# Patient Record
Sex: MALE | Race: WHITE | NOT HISPANIC OR LATINO | Employment: UNEMPLOYED | URBAN - METROPOLITAN AREA
[De-identification: names, ages, dates, MRNs, and addresses within clinical notes are randomized per-mention and may not be internally consistent; named-entity substitution may affect disease eponyms.]

---

## 2023-06-05 ENCOUNTER — HOSPITAL ENCOUNTER (EMERGENCY)
Facility: HOSPITAL | Age: 53
Discharge: HOME | End: 2023-06-05
Attending: INTERNAL MEDICINE

## 2023-06-05 VITALS
HEART RATE: 72 BPM | WEIGHT: 200 LBS | HEIGHT: 72 IN | TEMPERATURE: 99 F | SYSTOLIC BLOOD PRESSURE: 126 MMHG | RESPIRATION RATE: 18 BRPM | DIASTOLIC BLOOD PRESSURE: 86 MMHG | BODY MASS INDEX: 27.09 KG/M2 | OXYGEN SATURATION: 98 %

## 2023-06-05 DIAGNOSIS — R13.10 TROUBLE SWALLOWING: ICD-10-CM

## 2023-06-05 DIAGNOSIS — R13.10 DYSPHAGIA, UNSPECIFIED TYPE: Primary | ICD-10-CM

## 2023-06-05 PROCEDURE — 96372 THER/PROPH/DIAG INJ SC/IM: CPT | Performed by: PHYSICIAN ASSISTANT

## 2023-06-05 PROCEDURE — 63600175 PHARM REV CODE 636 W HCPCS: Mod: JZ,JG | Performed by: PHYSICIAN ASSISTANT

## 2023-06-05 PROCEDURE — 99284 EMERGENCY DEPT VISIT MOD MDM: CPT

## 2023-06-05 RX ORDER — GLUCAGON 1 MG
1 KIT INJECTION
Status: COMPLETED | OUTPATIENT
Start: 2023-06-05 | End: 2023-06-05

## 2023-06-05 RX ADMIN — GLUCAGON HYDROCHLORIDE 1 MG: 1 INJECTION, POWDER, FOR SOLUTION INTRAMUSCULAR; INTRAVENOUS; SUBCUTANEOUS at 02:06

## 2023-06-05 NOTE — Clinical Note
"Tres Caraballo (Thomas) was seen and treated in our emergency department on 6/5/2023.  He may return to work on 06/06/2023.       If you have any questions or concerns, please don't hesitate to call.       RN    "

## 2023-06-05 NOTE — ED NOTES
Pt ate a hamburger at about 11 pm and has felt like something was in  his throat since. Pt has thrown up everything he tries to swallow.    LOC: Pt is awake alert and aware of environment, oriented X3 and speaking appropriately  Appearance: Pt is in no acute distress, Pt is well groomed and clean  Skin: skin is warm and dry with normal turgor, mucus membranes are moist and pink, skin is intact with no bruising or breakdown  Muskuloskeletal: Pt moves all extremities well, there is no obvious swelling or deformities noted, pulses are intact.  Respiratory: Airway is open and patent, respirations are spontaneous and even.  Cardiac: no edema and cap refill is <3sec  Abdomen: soft, non-tender and non-distended  Neuro: Pt follows commands easily and has no obvious deficits

## 2023-06-05 NOTE — ED PROVIDER NOTES
Encounter Date: 6/5/2023       History     Chief Complaint   Patient presents with    foreign body in the throat     Pt was eating a hamburger at about 11 pm and feels like it is stuck in his throat, unable to keep anything down, Hx of prior food bolus     52-year-old male presents to the ER for evaluation of a suspected impacted food bolus.  Patient states that since eating a hamburger around 11:00 p.m. he feels as though it is stuck in his throat.  He has had this happened before.  He was able to successfully pass the food bolus without surgical intervention.  He is not tolerating his secretions.  Appears moderately uncomfortable on exam.  No respiratory distress.  No drooling.  Speech is clear.    Review of patient's allergies indicates:  No Known Allergies  History reviewed. No pertinent past medical history.  History reviewed. No pertinent surgical history.  No family history on file.     Review of Systems   Constitutional:  Negative for fever.   HENT:  Negative for sore throat.         Trouble swallowing   Respiratory:  Negative for shortness of breath.    Cardiovascular:  Negative for chest pain.   Gastrointestinal:  Negative for nausea.   Genitourinary:  Negative for dysuria.   Musculoskeletal:  Negative for back pain.   Skin:  Negative for rash.   Neurological:  Negative for weakness.   Hematological:  Does not bruise/bleed easily.     Physical Exam     Initial Vitals [06/05/23 0146]   BP Pulse Resp Temp SpO2   (!) 142/72 64 18 97.7 °F (36.5 °C) 97 %      MAP       --         Physical Exam    Constitutional: Vital signs are normal. He appears well-developed and well-nourished.   HENT:   Head: Normocephalic and atraumatic.   Right Ear: Hearing normal.   Left Ear: Hearing normal.   No acute findings on exam.  Normal voice.  No dysphagia.  No obstructions within the posterior oropharynx   Eyes: Conjunctivae are normal.   Cardiovascular:  Normal rate and regular rhythm.           Abdominal: Abdomen is soft.  Bowel sounds are normal.   Musculoskeletal:         General: Normal range of motion.     Neurological: He is alert and oriented to person, place, and time.   Skin: Skin is warm and intact.   Psychiatric: He has a normal mood and affect. His speech is normal and behavior is normal. Cognition and memory are normal.       ED Course   Procedures  Labs Reviewed - No data to display       Imaging Results              X-Ray Neck Soft Tissue (Final result)  Result time 06/05/23 02:52:13      Final result by Colt Mackay MD (06/05/23 02:52:13)                   Impression:      Radiographic findings as above.      Electronically signed by: Colt Mackay  Date:    06/05/2023  Time:    02:52               Narrative:    EXAMINATION:  XR NECK SOFT TISSUE    CLINICAL HISTORY:  Dysphagia, unspecified    TECHNIQUE:  AP and lateral soft tissue views the neck were performed.    COMPARISON:  None.    FINDINGS:  The airway appears patent, there is no evidence for abnormal thickening of the epiglottis.  There is no radiographically detectable radiopaque foreign body noted.  There does appear to be mild prominent appearance of the prevertebral space at C6 measuring up to approximately 18.7 mm and at C7 measuring approximately 20.3 mm.  This is nonspecific, can be seen with they prevertebral space-occupying process, note is made of the history of concern for food bolus, there is no radiographically detectable radiopaque abnormality however a food bolus may not be radiopaque.  Clinical and historical correlation is needed to determine need for further evaluation.    The visualized osseous structures appear intact, chronic changes are noted.                                       Medications   glucagon (human recombinant) injection 1 mg (1 mg Intramuscular Given 6/5/23 0216)     Medical Decision Making:   History:   Old Medical Records: I decided to obtain old medical records.  Old Records Summarized: records from clinic  visits.  Initial Assessment:   52-year-old male with a possible impacted food bolus  Differential Diagnosis:   Impacted food bolus, esophageal dysmotility, GERD,  Clinical Tests:   Radiological Study: Ordered and Reviewed  ED Management:  Plan:  Glucagon, attempt treatment with Coca-Cola and reassessment  Glucagon administered.  Patient given Coca-Cola to sip on while jumping up and down with attempt to pass the food bolus    On reassessment at 3:20 a.m. in the morning the patient was able to pass the food bolus.  He is tolerating p.o..  No respiratory distress.  Patient discharged home.                        Clinical Impression:   Final diagnoses:  [R13.10] Trouble swallowing  [R13.10] Trouble swallowing - eval for food bolus  [R13.10] Dysphagia, unspecified type (Primary)        ED Disposition Condition    Discharge Stable          ED Prescriptions    None       Follow-up Information    None          Erik Jensen PA-C  06/05/23 2331

## 2023-06-05 NOTE — DISCHARGE INSTRUCTIONS

## 2023-06-17 ENCOUNTER — HOSPITAL ENCOUNTER (EMERGENCY)
Facility: HOSPITAL | Age: 53
Discharge: HOME | End: 2023-06-17
Attending: EMERGENCY MEDICINE

## 2023-06-17 VITALS
TEMPERATURE: 98 F | SYSTOLIC BLOOD PRESSURE: 153 MMHG | HEART RATE: 67 BPM | WEIGHT: 200 LBS | BODY MASS INDEX: 27.09 KG/M2 | RESPIRATION RATE: 20 BRPM | DIASTOLIC BLOOD PRESSURE: 94 MMHG | HEIGHT: 72 IN | OXYGEN SATURATION: 100 %

## 2023-06-17 DIAGNOSIS — R09.89 ABNORMAL LUNG SOUNDS: ICD-10-CM

## 2023-06-17 DIAGNOSIS — J01.90 ACUTE BACTERIAL SINUSITIS: Primary | ICD-10-CM

## 2023-06-17 DIAGNOSIS — B96.89 ACUTE BACTERIAL SINUSITIS: Primary | ICD-10-CM

## 2023-06-17 LAB
CTP QC/QA: YES
CTP QC/QA: YES
MOLECULAR STREP A: NEGATIVE
SARS-COV-2 RDRP RESP QL NAA+PROBE: NEGATIVE

## 2023-06-17 PROCEDURE — 99284 EMERGENCY DEPT VISIT MOD MDM: CPT | Mod: 25

## 2023-06-17 PROCEDURE — 87635 SARS-COV-2 COVID-19 AMP PRB: CPT | Performed by: EMERGENCY MEDICINE

## 2023-06-17 PROCEDURE — 25000003 PHARM REV CODE 250

## 2023-06-17 PROCEDURE — 87651 STREP A DNA AMP PROBE: CPT

## 2023-06-17 RX ORDER — BENZONATATE 200 MG/1
200 CAPSULE ORAL 3 TIMES DAILY PRN
Qty: 30 CAPSULE | Refills: 0 | Status: SHIPPED | OUTPATIENT
Start: 2023-06-17 | End: 2023-06-27

## 2023-06-17 RX ORDER — AMOXICILLIN AND CLAVULANATE POTASSIUM 875; 125 MG/1; MG/1
1 TABLET, FILM COATED ORAL 2 TIMES DAILY
Qty: 14 TABLET | Refills: 0 | Status: SHIPPED | OUTPATIENT
Start: 2023-06-17

## 2023-06-17 RX ORDER — ONDANSETRON 4 MG/1
4 TABLET, ORALLY DISINTEGRATING ORAL
Status: COMPLETED | OUTPATIENT
Start: 2023-06-17 | End: 2023-06-17

## 2023-06-17 RX ORDER — FLUTICASONE PROPIONATE 50 MCG
2 SPRAY, SUSPENSION (ML) NASAL DAILY
Qty: 15.8 ML | Refills: 0 | Status: SHIPPED | OUTPATIENT
Start: 2023-06-17 | End: 2023-07-17

## 2023-06-17 RX ORDER — AMOXICILLIN AND CLAVULANATE POTASSIUM 875; 125 MG/1; MG/1
1 TABLET, FILM COATED ORAL
Status: COMPLETED | OUTPATIENT
Start: 2023-06-17 | End: 2023-06-17

## 2023-06-17 RX ORDER — IBUPROFEN 600 MG/1
600 TABLET ORAL EVERY 6 HOURS PRN
Qty: 20 TABLET | Refills: 0 | Status: SHIPPED | OUTPATIENT
Start: 2023-06-17

## 2023-06-17 RX ORDER — CETIRIZINE HYDROCHLORIDE 10 MG/1
10 TABLET ORAL DAILY
Qty: 30 TABLET | Refills: 0 | Status: SHIPPED | OUTPATIENT
Start: 2023-06-17 | End: 2024-06-16

## 2023-06-17 RX ORDER — GUAIFENESIN/DEXTROMETHORPHAN 100-10MG/5
5 SYRUP ORAL EVERY 6 HOURS PRN
Qty: 473 ML | Refills: 0 | Status: SHIPPED | OUTPATIENT
Start: 2023-06-17 | End: 2023-06-27

## 2023-06-17 RX ORDER — ACETAMINOPHEN 500 MG
1000 TABLET ORAL EVERY 6 HOURS PRN
Qty: 56 TABLET | Refills: 0 | Status: SHIPPED | OUTPATIENT
Start: 2023-06-17 | End: 2023-06-24

## 2023-06-17 RX ADMIN — AMOXICILLIN AND CLAVULANATE POTASSIUM 1 TABLET: 875; 125 TABLET, FILM COATED ORAL at 08:06

## 2023-06-17 RX ADMIN — ONDANSETRON 4 MG: 4 TABLET, ORALLY DISINTEGRATING ORAL at 08:06

## 2023-06-17 NOTE — ED PROVIDER NOTES
Encounter Date: 6/17/2023    SCRIBE #1 NOTE: IJulia, marjorie scribing for, and in the presence of,  Tres Zheng PA-C. I have scribed the following portions of the note - Other sections scribed: HPI, ROS.     History     Chief Complaint   Patient presents with    Sinusitis     Pt c/o sinuses. Pt c/o congestion, headache, sore throat. Pt nauseated.      52 year old male with no pertinent PMHx presents to the ED for a chief complaint of sinusitis onset 4 days ago.  Patient reports sinus pressure, nasal congestion, and cough.  Patient reports that he has had decreased appetite and decreased p.o. fluid intake so he has been getting occasionally lightheaded while working. He endorses taking Tylenol with temporary relief of his Sx. Patient further reports that he is unable to eat or drink. Further endorses cough, nasal congestion, sore throat, nausea, headache, dry mouth, wheezing when breathing, and nose pain around ethmoid sinuses area. No other alleviating or exacerbating factors. Denies Hx of HTN, KD, or DM. Patient is not tolerate to PO intake. NKDA.              The history is provided by the patient. No  was used.   Review of patient's allergies indicates:  No Known Allergies  No past medical history on file.  No past surgical history on file.  No family history on file.     Review of Systems   Constitutional:  Negative for diaphoresis, fatigue and unexpected weight change.   HENT:  Positive for congestion, sinus pain and sore throat.         (+) dry mouth   Eyes:  Negative for pain, redness and visual disturbance.   Respiratory:  Positive for cough and wheezing. Negative for chest tightness and shortness of breath.    Cardiovascular:  Negative for chest pain and palpitations.   Gastrointestinal:  Positive for nausea. Negative for abdominal pain, blood in stool, diarrhea and vomiting.   Endocrine: Negative for polydipsia, polyphagia and polyuria.   Genitourinary:  Negative for  dysuria, frequency and urgency.   Musculoskeletal:  Negative for arthralgias, back pain and myalgias.   Skin:  Negative for rash.   Allergic/Immunologic: Negative for environmental allergies.   Neurological:  Positive for headaches. Negative for dizziness, syncope and light-headedness.   Psychiatric/Behavioral:  Negative for suicidal ideas.      Physical Exam     Initial Vitals [06/17/23 0710]   BP Pulse Resp Temp SpO2   (!) 153/94 64 18 98.6 °F (37 °C) 96 %      MAP       --         Physical Exam    Nursing note and vitals reviewed.  Constitutional: Vital signs are normal. He appears well-developed and well-nourished. He is not diaphoretic. He is cooperative.  Non-toxic appearance. He does not appear ill. No distress.   Patient was clinically well-appearing and in no acute distress.  Nontoxic.   HENT:   Head: Normocephalic and atraumatic.   Right Ear: Hearing, tympanic membrane, external ear and ear canal normal. Tympanic membrane is not perforated, not retracted and not bulging.   Left Ear: Hearing, tympanic membrane, external ear and ear canal normal. Tympanic membrane is not perforated, not retracted and not bulging.   Nose: Right sinus exhibits maxillary sinus tenderness. Right sinus exhibits no frontal sinus tenderness. Left sinus exhibits maxillary sinus tenderness. Left sinus exhibits no frontal sinus tenderness.   Mouth/Throat: Oropharynx is clear and moist. Mucous membranes are dry. No oropharyngeal exudate, posterior oropharyngeal edema or posterior oropharyngeal erythema.   Nasal congestion present.  Tenderness to palpation over the maxillary sinuses, worst towards the medial aspect.   Eyes: Conjunctivae and EOM are normal. Pupils are equal, round, and reactive to light.   Neck: Phonation normal. Neck supple. No stridor present.   Normal range of motion.   Full passive range of motion without pain.     Cardiovascular:  Normal rate, regular rhythm, S1 normal, S2 normal and normal heart sounds.  No  extrasystoles are present.          No murmur heard.  Pulses:       Radial pulses are 2+ on the right side and 2+ on the left side.        Posterior tibial pulses are 2+ on the right side and 2+ on the left side.   Pulmonary/Chest: Effort normal. No accessory muscle usage. No tachypnea. No respiratory distress. He has rhonchi in the right middle field. He exhibits no tenderness.   Respirations even and unlabored.  Good air movement.  Expiratory rhonchi noted on the right middle lung fields.   Abdominal: Abdomen is soft and flat. Bowel sounds are normal. He exhibits no distension. There is no abdominal tenderness. There is no rebound and no guarding.   Musculoskeletal:         General: No tenderness. Normal range of motion.      Cervical back: Full passive range of motion without pain, normal range of motion and neck supple. No spinous process tenderness. Normal range of motion.     Lymphadenopathy:     He has no cervical adenopathy.   Neurological: He is alert and oriented to person, place, and time. GCS eye subscore is 4. GCS verbal subscore is 5. GCS motor subscore is 6.   Skin: Skin is warm and dry. Capillary refill takes less than 2 seconds. No rash noted. No pallor.   Psychiatric: His behavior is normal.       ED Course   Procedures  Labs Reviewed   POCT STREP A MOLECULAR   SARS-COV-2 RDRP GENE          Imaging Results              X-Ray Chest PA And Lateral (Final result)  Result time 06/17/23 09:01:57      Final result by Josr Bass MD (06/17/23 09:01:57)                   Impression:      No convincing evidence of acute cardiopulmonary disease.      Electronically signed by: Josr Bass  Date:    06/17/2023  Time:    09:01               Narrative:    EXAMINATION:  XR CHEST PA AND LATERAL    CLINICAL HISTORY:  Other specified symptoms and signs involving the circulatory and respiratory systems    TECHNIQUE:  PA and lateral views of the chest were  performed.    COMPARISON:  None    FINDINGS:  Cardiomediastinal contour appears to be within normal limits.  Lungs appear essentially clear.  No definite pneumothorax or large volume pleural effusion.    No acute findings in the visualized abdomen.  Osseous and soft tissue structures appear without definite acute change.                                    X-Rays:   Independently Interpreted Readings:   Chest X-Ray: No infiltrates. Trachea is midline.  Lungs symmetrically expanded.  No focal consolidative process.  Bilateral costophrenic angles are clear.  Cardiac silhouette is within normal limits.  Bones and soft tissues are unremarkable.    Medications   amoxicillin-clavulanate 875-125mg per tablet 1 tablet (1 tablet Oral Given 6/17/23 0856)   ondansetron disintegrating tablet 4 mg (4 mg Oral Given 6/17/23 0856)     Medical Decision Making:   History:   Old Medical Records: I decided to obtain old medical records.  Old Records Summarized: other records.       <> Summary of Records: External documents reviewed.  Initial Assessment:   52-year-old male presenting to the emergency department with a chief complaint of upper respiratory symptoms.  He has had sinus pressure, nasal congestion, and cough for the last week.  On physical exam, patient appears uncomfortable but is nontoxic appearing.  Nasal congestion and cough present.  Tenderness to palpation over the maxillary sinuses bilaterally.  Differential Diagnosis:   Differential diagnosis includes but is not limited to respiratory infections including COVID, flu, bronchitis, rhinosinusitis, or pneumonia, or noninfectious processes such as asthma, COPD or seasonal allergies.   Independently Interpreted Test(s):   I have ordered and independently interpreted X-rays - see prior notes.  Clinical Tests:   Lab Tests: Ordered and Reviewed       <> Summary of Lab: COVID and strep negative.  Radiological Study: Ordered and Reviewed  ED Management:  Patient presenting to the  emergency department with a chief complaint of upper respiratory symptoms.  History and physical exam findings as above.  Patient tested negative for COVID and strep.  X-ray negative for any acute cardiopulmonary process.  Presentation is consistent with acute bacterial rhinosinusitis.  First dose of Augmentin and Zofran were administered in the emergency department.  Augmentin and numerous symptomatic medications were electronically prescribed and sent to the patient's preferred pharmacy.  Discussed the importance of maintaining adequate hydration.    Return precautions were discussed, all patient questions were answered, and the patient was agreeable to the plan of care.  He was discharged home in stable condition and will follow up with his primary care provider or return to the emergency department if his symptoms worsen or do not improve.         Scribe Attestation:   Scribe #1: I performed the above scribed service and the documentation accurately describes the services I performed. I attest to the accuracy of the note.                 Scribe attestation: I, Tres Zheng PA-C, personally performed the services described in this documentation. All medical record entries made by the scribe were at my direction and in my presence.  I have reviewed the chart and agree that the record reflects my personal performance and is accurate and complete.    Clinical Impression:   Final diagnoses:  [R09.89] Abnormal lung sounds  [J01.90, B96.89] Acute bacterial sinusitis (Primary)        ED Disposition Condition    Discharge Stable          ED Prescriptions       Medication Sig Dispense Start Date End Date Auth. Provider    amoxicillin-clavulanate 875-125mg (AUGMENTIN) 875-125 mg per tablet Take 1 tablet by mouth 2 (two) times daily. 14 tablet 6/17/2023 -- Tres Zheng PA-C    fluticasone propionate (FLONASE) 50 mcg/actuation nasal spray 2 sprays (100 mcg total) by Each Nostril route once daily. 15.8 mL 6/17/2023  7/17/2023 Tres Zheng PA-C    cetirizine (ZYRTEC) 10 MG tablet Take 1 tablet (10 mg total) by mouth once daily. 30 tablet 6/17/2023 6/16/2024 Tres Zheng PA-C    benzocaine-menthoL 6-10 mg lozenge Take 1 lozenge by mouth every 2 (two) hours as needed. 36 tablet 6/17/2023 -- Tres Zheng PA-C    dextromethorphan-guaiFENesin  mg/5 ml (ROBITUSSIN-DM)  mg/5 mL liquid Take 5 mLs by mouth every 6 (six) hours as needed (cough). 473 mL 6/17/2023 6/27/2023 Tres Zheng PA-C    benzonatate (TESSALON) 200 MG capsule Take 1 capsule (200 mg total) by mouth 3 (three) times daily as needed for Cough. 30 capsule 6/17/2023 6/27/2023 Tres Zheng PA-C    ibuprofen (ADVIL,MOTRIN) 600 MG tablet Take 1 tablet (600 mg total) by mouth every 6 (six) hours as needed for Pain. 20 tablet 6/17/2023 -- Tres Zheng PA-C    acetaminophen (TYLENOL) 500 MG tablet Take 2 tablets (1,000 mg total) by mouth every 6 (six) hours as needed for Pain. 56 tablet 6/17/2023 6/24/2023 Tres Zheng PA-C    sodium chloride (SALINE NASAL) 0.65 % nasal spray 1 spray by Nasal route as needed for Congestion. 50 mL 6/17/2023 -- Tres Zheng PA-C          Follow-up Information       Follow up With Specialties Details Why Contact Info    [facility]  Schedule an appointment as soon as possible for a visit  As needed, If symptoms worsen [address] [facility] [address]  [phone]      [facility] Emergency Dept Emergency Medicine Go to  If symptoms worsen [address] [facility] [address] [phone]             Tres Zheng PA-C  06/17/23 3657

## 2023-06-17 NOTE — DISCHARGE INSTRUCTIONS

## 2023-06-17 NOTE — Clinical Note
"Tres Garcia" Rashmi was seen and treated in our emergency department on 6/17/2023.  He may return to work on 06/19/2023.       If you have any questions or concerns, please don't hesitate to call.      Tres Zheng PA-C"